# Patient Record
Sex: FEMALE | Race: WHITE
[De-identification: names, ages, dates, MRNs, and addresses within clinical notes are randomized per-mention and may not be internally consistent; named-entity substitution may affect disease eponyms.]

---

## 2017-06-26 ENCOUNTER — HOSPITAL ENCOUNTER (EMERGENCY)
Dept: HOSPITAL 6 - ED | Age: 52
Discharge: HOME | End: 2017-06-26
Payer: COMMERCIAL

## 2017-06-26 ENCOUNTER — HOSPITAL ENCOUNTER (OUTPATIENT)
Dept: HOSPITAL 6 - AMSURD | Age: 52
End: 2017-06-26
Attending: FAMILY MEDICINE
Payer: COMMERCIAL

## 2017-06-26 VITALS
DIASTOLIC BLOOD PRESSURE: 61 MMHG | SYSTOLIC BLOOD PRESSURE: 125 MMHG | DIASTOLIC BLOOD PRESSURE: 61 MMHG | SYSTOLIC BLOOD PRESSURE: 125 MMHG | DIASTOLIC BLOOD PRESSURE: 61 MMHG | SYSTOLIC BLOOD PRESSURE: 125 MMHG

## 2017-06-26 VITALS — DIASTOLIC BLOOD PRESSURE: 67 MMHG | SYSTOLIC BLOOD PRESSURE: 122 MMHG

## 2017-06-26 VITALS — HEIGHT: 55 IN | WEIGHT: 177.47 LBS

## 2017-06-26 VITALS — SYSTOLIC BLOOD PRESSURE: 146 MMHG | DIASTOLIC BLOOD PRESSURE: 76 MMHG

## 2017-06-26 VITALS — DIASTOLIC BLOOD PRESSURE: 67 MMHG | SYSTOLIC BLOOD PRESSURE: 110 MMHG

## 2017-06-26 DIAGNOSIS — K57.30: ICD-10-CM

## 2017-06-26 DIAGNOSIS — R10.13: ICD-10-CM

## 2017-06-26 DIAGNOSIS — K52.9: ICD-10-CM

## 2017-06-26 DIAGNOSIS — K29.00: Primary | ICD-10-CM

## 2017-06-26 DIAGNOSIS — R10.31: Primary | ICD-10-CM

## 2017-06-26 NOTE — NUR
patient out to Nurses station , states she is on the phone to Dr. Garcia, he
tells this Nurse he will dismiss the patient to home after he arrives here to
give her some prescriptions.

## 2017-06-26 NOTE — NUR
patient contacts Dr. Layne office, she is told they have the lab results
and Dr. Garcia should call for C-T results, patient takes ice chips

## 2017-06-26 NOTE — NUR
Vannessa from Dr. Layne office calls stating "no prior authorization is
needed for patients C-T of abdomen/pelvis".

## 2017-06-26 NOTE — NUR
Dr. Garcia arrives, visits with patient, prescriptions given, she is
dismissed ambulatory to care of spouse, in no obvious distress at this time.

## 2017-06-26 NOTE — NUR
arrives from Dr. Layne office after continued c/o abdominal pain after
dismissal from ED this am, she states the "GI Cocktail" worked very well for
about an hour. also states she did fill the Prilosec and took one, arrives
with pain 10/10 and it comes/goes in waves.

## 2017-08-18 ENCOUNTER — HOSPITAL ENCOUNTER (OUTPATIENT)
Dept: HOSPITAL 19 - SDCO | Age: 52
Discharge: HOME | End: 2017-08-18
Attending: SPECIALIST
Payer: COMMERCIAL

## 2017-08-18 VITALS — TEMPERATURE: 97.9 F | DIASTOLIC BLOOD PRESSURE: 68 MMHG | SYSTOLIC BLOOD PRESSURE: 139 MMHG | HEART RATE: 78 BPM

## 2017-08-18 VITALS — BODY MASS INDEX: 27.73 KG/M2 | WEIGHT: 166.45 LBS | HEIGHT: 65 IN

## 2017-08-18 VITALS — TEMPERATURE: 97.9 F | SYSTOLIC BLOOD PRESSURE: 110 MMHG | HEART RATE: 78 BPM | DIASTOLIC BLOOD PRESSURE: 67 MMHG

## 2017-08-18 DIAGNOSIS — K22.5: ICD-10-CM

## 2017-08-18 DIAGNOSIS — K20.9: ICD-10-CM

## 2017-08-18 DIAGNOSIS — F17.200: ICD-10-CM

## 2017-08-18 DIAGNOSIS — K52.832: Primary | ICD-10-CM

## 2017-08-18 DIAGNOSIS — Z90.49: ICD-10-CM

## 2017-08-18 DIAGNOSIS — K21.9: ICD-10-CM

## 2017-08-18 DIAGNOSIS — E05.00: ICD-10-CM

## 2017-08-18 DIAGNOSIS — Z90.710: ICD-10-CM

## 2017-08-18 DIAGNOSIS — K57.30: ICD-10-CM

## 2017-08-18 DIAGNOSIS — K59.00: ICD-10-CM

## 2017-10-11 ENCOUNTER — HOSPITAL ENCOUNTER (OUTPATIENT)
Dept: HOSPITAL 6 - RAD | Age: 52
End: 2017-10-11
Attending: FAMILY MEDICINE
Payer: COMMERCIAL

## 2017-10-11 DIAGNOSIS — R10.11: Primary | ICD-10-CM

## 2017-10-11 DIAGNOSIS — K57.30: ICD-10-CM

## 2017-10-11 DIAGNOSIS — K22.5: ICD-10-CM

## 2018-01-09 ENCOUNTER — HOSPITAL ENCOUNTER (OUTPATIENT)
Dept: HOSPITAL 6 - RAD | Age: 53
End: 2018-01-09
Payer: COMMERCIAL

## 2018-01-09 DIAGNOSIS — Z12.31: Primary | ICD-10-CM
